# Patient Record
Sex: MALE
[De-identification: names, ages, dates, MRNs, and addresses within clinical notes are randomized per-mention and may not be internally consistent; named-entity substitution may affect disease eponyms.]

---

## 2022-03-07 PROBLEM — Z00.00 ENCOUNTER FOR PREVENTIVE HEALTH EXAMINATION: Status: ACTIVE | Noted: 2022-03-07

## 2022-03-14 ENCOUNTER — NON-APPOINTMENT (OUTPATIENT)
Age: 27
End: 2022-03-14

## 2022-03-14 ENCOUNTER — APPOINTMENT (OUTPATIENT)
Dept: ELECTROPHYSIOLOGY | Facility: CLINIC | Age: 27
End: 2022-03-14
Payer: MEDICAID

## 2022-03-14 VITALS
DIASTOLIC BLOOD PRESSURE: 80 MMHG | TEMPERATURE: 98.7 F | HEIGHT: 67 IN | OXYGEN SATURATION: 100 % | HEART RATE: 125 BPM | BODY MASS INDEX: 18.79 KG/M2 | SYSTOLIC BLOOD PRESSURE: 128 MMHG

## 2022-03-14 VITALS — WEIGHT: 120 LBS

## 2022-03-14 DIAGNOSIS — Z87.09 PERSONAL HISTORY OF OTHER DISEASES OF THE RESPIRATORY SYSTEM: ICD-10-CM

## 2022-03-14 DIAGNOSIS — I47.1 SUPRAVENTRICULAR TACHYCARDIA: ICD-10-CM

## 2022-03-14 DIAGNOSIS — Z86.19 PERSONAL HISTORY OF OTHER INFECTIOUS AND PARASITIC DISEASES: ICD-10-CM

## 2022-03-14 DIAGNOSIS — R00.2 PALPITATIONS: ICD-10-CM

## 2022-03-14 DIAGNOSIS — Z78.9 OTHER SPECIFIED HEALTH STATUS: ICD-10-CM

## 2022-03-14 DIAGNOSIS — R06.02 SHORTNESS OF BREATH: ICD-10-CM

## 2022-03-14 PROCEDURE — 93000 ELECTROCARDIOGRAM COMPLETE: CPT

## 2022-03-14 PROCEDURE — 99204 OFFICE O/P NEW MOD 45 MIN: CPT

## 2022-03-16 NOTE — DISCUSSION/SUMMARY
[FreeTextEntry1] : Impression:\par \par 1. Atrial tachycardia: EKG performed today to assess for presence of tachyarrhythmias and reveals AT. Given symptoms, suspect AT causing his frequent palpitations and dyspnea. Not currently on medication. Discussed treatment options such as rate control management vs possible ablation. Will attempt Cardizem 120mg daily for improved rate control/symptom management. \par \par  Resume regular f/u with Cardiologist and may RTO as needed or if any new or worsening symptoms occur.

## 2022-03-16 NOTE — HISTORY OF PRESENT ILLNESS
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 For the past 10 months has been struggling with shortness of breath and palpitations. Was seen at [facility] and was told everything was normal and to follow up with Cardiologist. Cardiologist has been reassuring him and then placed on Holter monitor where he was found to have an episode of suspected AT or SVT to the 140s. States he feels his HR elevate often, even with simple movements or with sneezing, and then can gradually improve. He also saw Pulmonary and was told he has asthma and underwent CT of chest with multiple pulmonary nodules, pending repeat CT of chest. At present, still struggling with dyspnea and palpitations. Denies chest pain, syncope or near syncope. Not currently on medication. \par \par

## 2022-04-11 ENCOUNTER — RX RENEWAL (OUTPATIENT)
Age: 27
End: 2022-04-11

## 2022-04-12 ENCOUNTER — RX CHANGE (OUTPATIENT)
Age: 27
End: 2022-04-12

## 2022-04-12 RX ORDER — DILTIAZEM HYDROCHLORIDE 120 MG/1
120 TABLET, EXTENDED RELEASE ORAL DAILY
Qty: 90 | Refills: 3 | Status: DISCONTINUED | COMMUNITY
Start: 2022-03-14 | End: 2022-04-12

## 2022-04-12 RX ORDER — DILTIAZEM HYDROCHLORIDE 120 MG/1
120 CAPSULE, EXTENDED RELEASE ORAL
Qty: 90 | Refills: 3 | Status: ACTIVE | COMMUNITY
Start: 2022-04-12 | End: 1900-01-01